# Patient Record
Sex: MALE | Race: WHITE | NOT HISPANIC OR LATINO | Employment: UNEMPLOYED | URBAN - METROPOLITAN AREA
[De-identification: names, ages, dates, MRNs, and addresses within clinical notes are randomized per-mention and may not be internally consistent; named-entity substitution may affect disease eponyms.]

---
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
## 2024-09-26 ENCOUNTER — OCCMED (OUTPATIENT)
Dept: URGENT CARE | Facility: MEDICAL CENTER | Age: 24
End: 2024-09-26
Payer: OTHER MISCELLANEOUS

## 2024-09-26 ENCOUNTER — APPOINTMENT (OUTPATIENT)
Dept: RADIOLOGY | Facility: MEDICAL CENTER | Age: 24
End: 2024-09-26
Payer: OTHER MISCELLANEOUS

## 2024-09-26 DIAGNOSIS — T14.90XA TRAUMA: Primary | ICD-10-CM

## 2024-09-26 DIAGNOSIS — T14.90XA TRAUMA: ICD-10-CM

## 2024-09-26 PROCEDURE — 73140 X-RAY EXAM OF FINGER(S): CPT

## 2024-09-26 PROCEDURE — G0382 LEV 3 HOSP TYPE B ED VISIT: HCPCS | Performed by: FAMILY MEDICINE

## 2024-09-26 PROCEDURE — 90715 TDAP VACCINE 7 YRS/> IM: CPT

## 2024-09-26 PROCEDURE — 99283 EMERGENCY DEPT VISIT LOW MDM: CPT | Performed by: FAMILY MEDICINE

## 2024-09-26 PROCEDURE — 90471 IMMUNIZATION ADMIN: CPT | Performed by: FAMILY MEDICINE

## 2024-10-01 NOTE — PROGRESS NOTES
This encounter was created for [facility] orders only .   [facility]        NAME: Yan Richards is a 24 y.o. male  : 2000    MRN: [id number]  DATE: 2024  TIME: 3:39 PM    Assessment and Plan   Trauma [T14.90XA]  1. Trauma  XR finger right third digit-middle            Patient Instructions       Follow up with PCP in 3-5 days.  Proceed to  ER if symptoms worsen.    If tests have been performed at [facility], our office will contact you with results if changes need to be made to the care plan discussed with you at the visit.  You can review your full results on [facility].    Chief Complaint   No chief complaint on file.        History of Present Illness       HPI    Review of Systems   Review of Systems      Current Medications     No current outpatient medications on file.    Current Allergies     Allergies as of 2024    (Not on File)            The following portions of the patient's history were reviewed and updated as appropriate: allergies, current medications, past family history, past medical history, past social history, past surgical history and problem list.     No past medical history on file.    No past surgical history on file.    No family history on file.      Medications have been verified.        Objective   There were no vitals taken for this visit.  No LMP for male patient.       Physical Exam     Physical Exam              
no

## 2024-10-02 VITALS
HEIGHT: 66 IN | BODY MASS INDEX: 18.16 KG/M2 | SYSTOLIC BLOOD PRESSURE: 100 MMHG | DIASTOLIC BLOOD PRESSURE: 60 MMHG | WEIGHT: 113 LBS

## 2024-10-02 DIAGNOSIS — S67.10XA CRUSH INJURY TO FINGER, INITIAL ENCOUNTER: Primary | ICD-10-CM

## 2024-10-02 PROCEDURE — 99203 OFFICE O/P NEW LOW 30 MIN: CPT | Performed by: SURGERY

## 2024-10-02 NOTE — LETTER
October 2, 2024     Patient: Yan Richards  YOB: 2000  Date of Visit: 10/2/2024      To Whom it May Concern:    Yan Richards is under my professional care. Yan was seen in my office on 10/2/2024. Yan may return to work with limitations : No firm grasping, heavy lifting, pushing, pulling with Right hand. Must keep Right hand clean .    If you have any questions or concerns, please don't hesitate to call.         Sincerely,          John Heredia MD

## 2024-10-02 NOTE — PROGRESS NOTES
ORTHOPAEDIC HAND, WRIST, AND ELBOW OFFICE  VISIT       ASSESSMENT/PLAN:      24 y.o. year old male who presents with Right Middle finger crush injury    Wound inspected. No signs of infection. Nail lifting off nail bed  Discussed new nail growth can take time  May wash normally  May cover with band aid. Finger redressed with band aid, 2x2 guaze and Coban today  Encourage finger motion at MCP and PIP joint  NSAID's as needed  Work note provided    The patient verbalized understanding of exam findings and treatment plan. We engaged in the shared decision-making process and treatment options were discussed at length with the patient. Surgical and conservative management discussed today along with risks and benefits.    Diagnoses and all orders for this visit:    Crush injury to finger, initial encounter        Follow Up:  Return in about 2 weeks (around 10/16/2024) for Recheck.    To Do Next Visit:  Re-evaluation of current issue        ____________________________________________________________________________________________________________________________________________      CHIEF COMPLAINT:  Chief Complaint   Patient presents with    Right Hand - Pain     Middle finger, nail bed   Jammed at work  9/26/24       SUBJECTIVE:  Yan Richards is a 24 y.o. year old  male who presents for evaluation      On 9/26/2024 patient states his Right middle finger tip was crushed while working. He was moving a metal cylinder when it was pinched. He states he had immediate pain in his finger and pulled his finger out between the two objects. He was seen in the Urgent care that day and his finger was XR and was dressed in a soft bandage.   He was also prescribe antibiotics which he is currently taking  He has mild pain at the finger tip and he has a loss of feeling as well.  Finger is dressed in Gauze and Coban. There is Neosporin n the finger tip      finger for work and ripped R middel fnger nail off. Was seen in UC.   UC took R and  "wrapped finegr up. Prescibed sarahy magaña is yohan them     I have personally reviewed all the relevant PMH, PSH, SH, FH, Medications and allergies      PAST MEDICAL HISTORY:  No past medical history on file.    PAST SURGICAL HISTORY:  No past surgical history on file.    FAMILY HISTORY:  Family History   Family history unknown: Yes       SOCIAL HISTORY:  Social History     Tobacco Use    Smoking status: Never    Smokeless tobacco: Never   Substance Use Topics    Alcohol use: Yes     Comment: socially    Drug use: Never       MEDICATIONS:  No current outpatient medications on file.    ALLERGIES:  No Known Allergies        REVIEW OF SYSTEMS:  Review of Systems   Constitutional:  Negative for chills and fever.   HENT:  Negative for ear pain and sore throat.    Eyes:  Negative for pain and visual disturbance.   Respiratory:  Negative for cough and shortness of breath.    Cardiovascular:  Negative for chest pain and palpitations.   Gastrointestinal:  Negative for abdominal pain and vomiting.   Genitourinary:  Negative for dysuria and hematuria.   Musculoskeletal:  Negative for arthralgias and back pain.   Skin:  Positive for wound. Negative for color change and rash.   Neurological:  Negative for seizures and syncope.   All other systems reviewed and are negative.      VITALS:  Vitals:    10/02/24 0939   BP: 100/60       LABS:  HgA1c: No results found for: \"HGBA1C\"  BMP:   Lab Results   Component Value Date    CALCIUM 9.9 11/13/2020    K 4.4 11/13/2020    CO2 29 11/13/2020     11/13/2020    BUN 12 11/13/2020    CREATININE 0.84 11/13/2020       _____________________________________________________  PHYSICAL EXAMINATION:  General: well developed and well nourished, alert, oriented times 3, and appears comfortable  Psychiatric: Normal  HEENT: Normocephalic, Atraumatic Trachea Midline, No torticollis  Pulmonary: No audible wheezing or respiratory distress   Abdomen/GI: Non tender, non distended   Cardiovascular: " No pitting edema, 2+ radial pulse   Skin: No masses, erythema, lacerations, fluctation, ulcerations  Neurovascular: Sensation Intact to the Median, Ulnar, Radial Nerve, Motor Intact to the Median, Ulnar, Radial Nerve, and Pulses Intact  Musculoskeletal: Normal, except as noted in detailed exam and in HPI.      MUSCULOSKELETAL EXAMINATION:  Right hand:  SILT  Composite fist no tested    Right long finger  Nail lifting off nail bed. Mild swelling. Mild erythema noted    Left hand:  SILT  Composite fist    ___________________________________________________  STUDIES REVIEWED:  I have personally reviewed AP lateral and oblique radiographs of Right hand 9/26/2024   which demonstrate FINDINGS:     No acute fracture or dislocation.     No significant degenerative changes.     No lytic or blastic osseous lesion.     There appears to be elevation of the third fingernail and possibly some gas under the nail and/or in the adjacent soft tissues along the dorsal aspect of the end of the third digit. No radiopaque foreign body identified.     IMPRESSION:     Soft tissue/finger nail region injury at the end of the third digit. Underlying bony structures intact. No foreign body seen           PROCEDURES PERFORMED:  Procedures  No Procedures performed today    _____________________________________________________      Scribe Attestation      I,:  Alfred Erickson am acting as a scribe while in the presence of the attending physician.:       I,:  John Heredia MD personally performed the services described in this documentation    as scribed in my presence.:

## 2024-10-21 VITALS
BODY MASS INDEX: 18.16 KG/M2 | DIASTOLIC BLOOD PRESSURE: 68 MMHG | HEIGHT: 66 IN | SYSTOLIC BLOOD PRESSURE: 120 MMHG | WEIGHT: 113 LBS

## 2024-10-21 DIAGNOSIS — S67.10XD CRUSHING INJURY OF FINGER, SUBSEQUENT ENCOUNTER: Primary | ICD-10-CM

## 2024-10-21 PROCEDURE — 99213 OFFICE O/P EST LOW 20 MIN: CPT | Performed by: SURGERY

## 2024-10-21 NOTE — PROGRESS NOTES
ORTHOPAEDIC HAND, WRIST, AND ELBOW OFFICE  VISIT       ASSESSMENT/PLAN:      24 y.o. year old male who presents with Right Middle finger crush injury    Doing well.   Cont actvities as tolerated  Discussed nail growth will take a few months.  New nail is starting to grow  NSAIDs as needed    The patient verbalized understanding of exam findings and treatment plan. We engaged in the shared decision-making process and treatment options were discussed at length with the patient. Surgical and conservative management discussed today along with risks and benefits.    There are no diagnoses linked to this encounter.      Follow Up:  Return if symptoms worsen or fail to improve.          ____________________________________________________________________________________________________________________________________________      CHIEF COMPLAINT:  Chief Complaint   Patient presents with   • Right Hand - Follow-up     Middle finger, nail bed   Jammed at work  9/26/24       SUBJECTIVE:  Yan Richards is a 24 y.o. year old  male who presents for evaluation      On 9/26/2024 patient states his Right middle finger tip was crushed while working. He was moving a metal cylinder when it was pinched.   Since last visit he has returned to work. His finger motion is normal. He stopped any bandages over the nail at home but will still place one on at work. He states his finger is still a little sensitive but no pain.    I have personally reviewed all the relevant PMH, PSH, SH, FH, Medications and allergies      PAST MEDICAL HISTORY:  No past medical history on file.    PAST SURGICAL HISTORY:  No past surgical history on file.    FAMILY HISTORY:  Family History   Family history unknown: Yes       SOCIAL HISTORY:  Social History     Tobacco Use   • Smoking status: Never   • Smokeless tobacco: Never   Substance Use Topics   • Alcohol use: Yes     Comment: socially   • Drug use: Never       MEDICATIONS:  No current outpatient medications on  "file.    ALLERGIES:  No Known Allergies        REVIEW OF SYSTEMS:  Review of Systems   Constitutional:  Negative for chills and fever.   HENT:  Negative for ear pain and sore throat.    Eyes:  Negative for pain and visual disturbance.   Respiratory:  Negative for cough and shortness of breath.    Cardiovascular:  Negative for chest pain and palpitations.   Gastrointestinal:  Negative for abdominal pain and vomiting.   Genitourinary:  Negative for dysuria and hematuria.   Musculoskeletal:  Negative for arthralgias and back pain.   Skin:  Positive for wound. Negative for color change and rash.   Neurological:  Negative for seizures and syncope.   All other systems reviewed and are negative.      VITALS:  Vitals:    10/21/24 1520   BP: 120/68       LABS:  HgA1c: No results found for: \"HGBA1C\"  BMP:   Lab Results   Component Value Date    CALCIUM 9.9 11/13/2020    K 4.4 11/13/2020    CO2 29 11/13/2020     11/13/2020    BUN 12 11/13/2020    CREATININE 0.84 11/13/2020       _____________________________________________________  PHYSICAL EXAMINATION:  General: well developed and well nourished, alert, oriented times 3, and appears comfortable  Psychiatric: Normal  HEENT: Normocephalic, Atraumatic Trachea Midline, No torticollis  Pulmonary: No audible wheezing or respiratory distress   Abdomen/GI: Non tender, non distended   Cardiovascular: No pitting edema, 2+ radial pulse   Skin: No masses, erythema, lacerations, fluctation, ulcerations  Neurovascular: Sensation Intact to the Median, Ulnar, Radial Nerve, Motor Intact to the Median, Ulnar, Radial Nerve, and Pulses Intact  Musculoskeletal: Normal, except as noted in detailed exam and in HPI.      MUSCULOSKELETAL EXAMINATION:  Right hand:  SILT  Composite fist no tested  Nail plate missing with new growth proximally  Right long finger  Full AROM of PIP and DIP joint    Left hand:  SILT  Composite fist    ___________________________________________________  STUDIES " REVIEWED:      No new images obtained/reviewed      I have personally reviewed AP lateral and oblique radiographs of Right hand 9/26/2024   which demonstrate FINDINGS:     No acute fracture or dislocation.     No significant degenerative changes.     No lytic or blastic osseous lesion.     There appears to be elevation of the third fingernail and possibly some gas under the nail and/or in the adjacent soft tissues along the dorsal aspect of the end of the third digit. No radiopaque foreign body identified.     IMPRESSION:     Soft tissue/finger nail region injury at the end of the third digit. Underlying bony structures intact. No foreign body seen           PROCEDURES PERFORMED:  Procedures  No Procedures performed today    _____________________________________________________      Scribe Attestation      I,:  Alfred Erickson am acting as a scribe while in the presence of the attending physician.:       I,:  John Heredia MD personally performed the services described in this documentation    as scribed in my presence.: